# Patient Record
Sex: FEMALE | Race: WHITE | Employment: FULL TIME | URBAN - NONMETROPOLITAN AREA
[De-identification: names, ages, dates, MRNs, and addresses within clinical notes are randomized per-mention and may not be internally consistent; named-entity substitution may affect disease eponyms.]

---

## 2020-07-19 ENCOUNTER — HOSPITAL ENCOUNTER (EMERGENCY)
Facility: OTHER | Age: 65
Discharge: HOME | End: 2020-07-19
Attending: STUDENT IN AN ORGANIZED HEALTH CARE EDUCATION/TRAINING PROGRAM | Admitting: STUDENT IN AN ORGANIZED HEALTH CARE EDUCATION/TRAINING PROGRAM
Payer: COMMERCIAL

## 2020-07-19 ENCOUNTER — APPOINTMENT (OUTPATIENT)
Dept: GENERAL RADIOLOGY | Facility: OTHER | Age: 65
End: 2020-07-19
Attending: STUDENT IN AN ORGANIZED HEALTH CARE EDUCATION/TRAINING PROGRAM
Payer: COMMERCIAL

## 2020-07-19 VITALS
WEIGHT: 162 LBS | DIASTOLIC BLOOD PRESSURE: 89 MMHG | HEART RATE: 80 BPM | TEMPERATURE: 97 F | HEIGHT: 66 IN | SYSTOLIC BLOOD PRESSURE: 150 MMHG | BODY MASS INDEX: 26.03 KG/M2 | RESPIRATION RATE: 16 BRPM | OXYGEN SATURATION: 98 %

## 2020-07-19 DIAGNOSIS — S51.012A LACERATION OF LEFT ELBOW, INITIAL ENCOUNTER: ICD-10-CM

## 2020-07-19 PROCEDURE — 12006 RPR S/N/A/GEN/TRK20.1-30.0CM: CPT | Mod: Z6 | Performed by: STUDENT IN AN ORGANIZED HEALTH CARE EDUCATION/TRAINING PROGRAM

## 2020-07-19 PROCEDURE — 73080 X-RAY EXAM OF ELBOW: CPT | Mod: LT

## 2020-07-19 PROCEDURE — 99284 EMERGENCY DEPT VISIT MOD MDM: CPT | Mod: Z6 | Performed by: STUDENT IN AN ORGANIZED HEALTH CARE EDUCATION/TRAINING PROGRAM

## 2020-07-19 PROCEDURE — 12006 RPR S/N/A/GEN/TRK20.1-30.0CM: CPT

## 2020-07-19 PROCEDURE — 25000128 H RX IP 250 OP 636: Performed by: STUDENT IN AN ORGANIZED HEALTH CARE EDUCATION/TRAINING PROGRAM

## 2020-07-19 PROCEDURE — 90715 TDAP VACCINE 7 YRS/> IM: CPT | Performed by: STUDENT IN AN ORGANIZED HEALTH CARE EDUCATION/TRAINING PROGRAM

## 2020-07-19 PROCEDURE — 25000125 ZZHC RX 250: Performed by: STUDENT IN AN ORGANIZED HEALTH CARE EDUCATION/TRAINING PROGRAM

## 2020-07-19 PROCEDURE — 90471 IMMUNIZATION ADMIN: CPT | Performed by: STUDENT IN AN ORGANIZED HEALTH CARE EDUCATION/TRAINING PROGRAM

## 2020-07-19 PROCEDURE — 99283 EMERGENCY DEPT VISIT LOW MDM: CPT | Mod: 25 | Performed by: STUDENT IN AN ORGANIZED HEALTH CARE EDUCATION/TRAINING PROGRAM

## 2020-07-19 PROCEDURE — 25000132 ZZH RX MED GY IP 250 OP 250 PS 637: Mod: GY | Performed by: STUDENT IN AN ORGANIZED HEALTH CARE EDUCATION/TRAINING PROGRAM

## 2020-07-19 RX ORDER — FERROUS SULFATE 325(65) MG
325 TABLET ORAL
COMMUNITY

## 2020-07-19 RX ORDER — ATENOLOL 50 MG/1
50 TABLET ORAL DAILY
COMMUNITY

## 2020-07-19 RX ORDER — NEOMYCIN/BACITRACIN/POLYMYXINB 3.5-400-5K
OINTMENT (GRAM) TOPICAL ONCE
Status: COMPLETED | OUTPATIENT
Start: 2020-07-19 | End: 2020-07-19

## 2020-07-19 RX ORDER — CEPHALEXIN 500 MG/1
500 CAPSULE ORAL 4 TIMES DAILY
Qty: 28 CAPSULE | Refills: 0 | Status: SHIPPED | OUTPATIENT
Start: 2020-07-19 | End: 2020-07-26

## 2020-07-19 RX ORDER — LIDOCAINE HYDROCHLORIDE AND EPINEPHRINE 10; 10 MG/ML; UG/ML
1 INJECTION, SOLUTION INFILTRATION; PERINEURAL ONCE
Status: COMPLETED | OUTPATIENT
Start: 2020-07-19 | End: 2020-07-19

## 2020-07-19 RX ADMIN — TETANUS TOXOID, REDUCED DIPHTHERIA TOXOID AND ACELLULAR PERTUSSIS VACCINE, ADSORBED 0.5 ML: 5; 2.5; 8; 8; 2.5 SUSPENSION INTRAMUSCULAR at 03:34

## 2020-07-19 RX ADMIN — LIDOCAINE HYDROCHLORIDE,EPINEPHRINE BITARTRATE 1 ML: 10; .01 INJECTION, SOLUTION INFILTRATION; PERINEURAL at 01:39

## 2020-07-19 RX ADMIN — BACITRACIN, NEOMYCIN, POLYMYXIN B 1 G: 400; 3.5; 5 OINTMENT TOPICAL at 03:37

## 2020-07-19 ASSESSMENT — MIFFLIN-ST. JEOR: SCORE: 1296.58

## 2020-07-19 NOTE — ED TRIAGE NOTES
Fell backwards banging her elbow on latch of a door tearing her elbow open. Denies hitting her head or any other injury.

## 2020-07-19 NOTE — ED PROVIDER NOTES
Mary Almonte  : 1955 Age: 65 year old Sex: female MRN: [id number]    CC: L arm laceration    HPI: Mary Almonte is a 65 year old female with little significant PMH who is presenting for evaluation of laceration to her L forearm. She slipped/fell bacwards and caught herself w/ her elbow hitting a door handle w/ large laceration, did not fall down, didn't hit her head. This happened at the cabin, immediately applied pressure, ice - lots of bleeding therefore arrives to ED via private car. Reports that despite bleeding, has no/minimal pain, able to move her hand, wrist, elbow, no change in strength or sensation in her arm at all. No other injuries, not on antiplatelets or anticoagulation.    ED Course and MDM:  Mary is a 66yo female w/o significant PMH who is presenting for evaluation of L arm injury. On arrival, HDS, well appearing, ambulatory,only injury to L arm; physical exam as below shows large irregular laceration to proximal L forearm w/ small arterial bleeding; distally neuro-vasc intact w/ strong pulses and intact strength and sensation. BLeeding controlled w/ cross-clamping the superficial a. Branch, the whole wound later explored -- reassuringly the compartment fascia is intact , elbow joint is not involved and XR is w/o fracture or dislocation. Wound washed out and repaired as per procedure note below w/o complications, after wound closed, repeat neuro-vasc exam unchanged, t-dap updated and pt discharged w/ wound care instructions and strict return precautions. Given extent of the wound also started prophy keflex.    Pt discharged in good condition, all questions answered, strict return precautions provided, at the time of discharge patient ambulatory tolerating oral intake without difficulty.      Procedures:  Laceration repair: L forearm wound washed out extensively w/ 2L sterile saline, superficial venous bleeding controlled w/ pressure. A single superficial a. Bleeding clamped and  "controled w/ lido+epi. After topical anaesthesia w/ lido+epi entire depth of the wound explored, no further bleeding after clamp released. Wound irrigated thoroughly w/ 2L sterile saline and repaired w/ 15x 3.0 nylon simple sutures w/ loose approximation. Pt tolerated well w/o complications.    Final Plan:  - large irregular laceration s/p primary repair w/ 15 3.0 nylon sutures  - dressing applied, and wound care instructions provided  - prophy keflex given the extend of the injury  - f/u w/ PCP on wound check in 2-3d  - sutures out in about a week  - strict return precautions provided    Final Clinical Impression:  - L arm laceration w/o neuro-vasc injury, no fracture or dislocation    Surendra Haynes MD      Physical Exam:  BP (!) 155/75   Pulse 73   Temp 97  F (36.1  C) (Tympanic)   Resp 18   Ht 1.676 m (5' 6\")   Wt 73.5 kg (162 lb)   SpO2 96%   BMI 26.15 kg/m      Gen: NAD, well appearing  HEENT: atraumatic, pupils equal and reactive, no midline neck ttp  CV: RRR, no m/r/g  Abd: soft, no ttp  Neuro: AOx3, no focal weakness  Msk: LUE as below,no other injuries  LUE: large gaping defect ( > 20cm) w/ irregular edges and stellate configuration proximally over medial aspect of proximal forearm extending from distal 3rd forearm to medial aspect fo ac fossa, within the wound superficial compartment visible w/o disruption of flexor fascia, diffuse venous oozing, a single superficial arterial pulsatile bleeding controlled w/ pressure.   Intact strength w/ elbow extension & flexion  5/5 wrist flexion/extension  Ok sign - wnl  Intact strength w/ finger abduction/aduction  Intact sensation in all nerve distributions  Strong radial and ulnar pulses  Normal cap refil    ROS:  ROS performed and noted in HPI    Family history:  History reviewed. No pertinent family history.      Social History:   Social History     Socioeconomic History     Marital status:      Spouse name: Not on file     Number of children: Not " on file     Years of education: Not on file     Highest education level: Not on file   Occupational History     Not on file   Social Needs     Financial resource strain: Not on file     Food insecurity     Worry: Not on file     Inability: Not on file     Transportation needs     Medical: Not on file     Non-medical: Not on file   Tobacco Use     Smoking status: Former Smoker     Smokeless tobacco: Never Used   Substance and Sexual Activity     Alcohol use: Yes     Alcohol/week: 4.0 standard drinks     Types: 4 Glasses of wine per week     Drug use: Not Currently     Sexual activity: Not on file   Lifestyle     Physical activity     Days per week: Not on file     Minutes per session: Not on file     Stress: Not on file   Relationships     Social connections     Talks on phone: Not on file     Gets together: Not on file     Attends [employer] service: Not on file     Active member of club or organization: Not on file     Attends meetings of clubs or organizations: Not on file     Relationship status: Not on file     Intimate partner violence     Fear of current or ex partner: Not on file     Emotionally abused: Not on file     Physically abused: Not on file     Forced sexual activity: Not on file   Other Topics Concern     Not on file   Social History Narrative     Not on file         Surgical History:  Past Surgical History:   Procedure Laterality Date     ABDOMEN SURGERY                      Surendra Haynes MD  07/21/20 0207

## 2020-07-19 NOTE — ED AVS SNAPSHOT
[facility]  [address]  [place] [address]  Phone:  [phone]  Fax:  [phone]                                    Mary Almonte   MRN: [id number]    Department:  [facility]   Date of Visit:  7/19/2020           After Visit Summary Signature Page    I have received my discharge instructions, and my questions have been answered. I have discussed any challenges I see with this plan with the nurse or doctor.    ..........................................................................................................................................  Patient/Patient Representative Signature      ..........................................................................................................................................  Patient Representative Print Name and Relationship to Patient    ..................................................               ................................................  Date                                   Time    ..........................................................................................................................................  Reviewed by Signature/Title    ...................................................              ..............................................  Date                                               Time          22EPIC Rev 08/18

## 2020-07-19 NOTE — DISCHARGE INSTRUCTIONS
Thank you for trusting us with your care today    You were seen today for a fall and a large laceration to your left elbow and forearm    This was repaired with 15 sutures that will need to be removed in about 7 days however given the extent of the cut please follow-up with your primary care provider for wound recheck    Please use Tylenol ibuprofen for pain    Should you have any new or worsening symptoms including worsening pain, swelling, redness please return to ED

## (undated) RX ORDER — NEOMYCIN/BACITRACIN/POLYMYXINB 3.5-400-5K
OINTMENT (GRAM) TOPICAL
Status: DISPENSED
Start: 2020-07-19

## (undated) RX ORDER — LIDOCAINE HYDROCHLORIDE AND EPINEPHRINE 10; 10 MG/ML; UG/ML
INJECTION, SOLUTION INFILTRATION; PERINEURAL
Status: DISPENSED
Start: 2020-07-19